# Patient Record
Sex: FEMALE | Race: BLACK OR AFRICAN AMERICAN | URBAN - METROPOLITAN AREA
[De-identification: names, ages, dates, MRNs, and addresses within clinical notes are randomized per-mention and may not be internally consistent; named-entity substitution may affect disease eponyms.]

---

## 2023-01-10 ENCOUNTER — APPOINTMENT (RX ONLY)
Dept: URBAN - METROPOLITAN AREA CLINIC 144 | Facility: CLINIC | Age: 53
Setting detail: DERMATOLOGY
End: 2023-01-10

## 2023-01-10 DIAGNOSIS — Z41.9 ENCOUNTER FOR PROCEDURE FOR PURPOSES OTHER THAN REMEDYING HEALTH STATE, UNSPECIFIED: ICD-10-CM

## 2023-01-10 PROCEDURE — ? CONSULTATION: VEIN REMOVAL

## 2023-01-10 NOTE — PROCEDURE: CONSULTATION: VEIN REMOVAL
Right Leg Ulcer Duration: 0- None
Include Vcss In The Note?: No
Detail Level: Simple
Left Leg Venous Hyperpigmentation: 0- None or focal low intensity (tan)
Left Leg Circumference: medium
Right Leg Compression Therapy: 0- None or noncompliant
Include Right Vcss In Note: Yes
Left Dorsalis Pedis Pulse: 2 (Easily palpable)